# Patient Record
Sex: MALE | Race: WHITE | Employment: FULL TIME | URBAN - METROPOLITAN AREA
[De-identification: names, ages, dates, MRNs, and addresses within clinical notes are randomized per-mention and may not be internally consistent; named-entity substitution may affect disease eponyms.]

---

## 2017-04-25 PROBLEM — N20.0 KIDNEY STONE: Status: ACTIVE | Noted: 2017-04-25

## 2017-07-28 PROBLEM — N52.9 ED (ERECTILE DYSFUNCTION) OF ORGANIC ORIGIN: Status: ACTIVE | Noted: 2017-07-28

## 2017-07-28 PROBLEM — N53.14 RETROGRADE EJACULATION: Status: ACTIVE | Noted: 2017-07-28

## 2019-10-02 PROBLEM — R73.9 ACUTE HYPERGLYCEMIA: Status: ACTIVE | Noted: 2019-10-02

## 2019-10-02 PROBLEM — E87.5 HYPERKALEMIA: Status: ACTIVE | Noted: 2019-10-02

## 2021-04-07 ENCOUNTER — APPOINTMENT (OUTPATIENT)
Dept: CT IMAGING | Age: 26
End: 2021-04-07
Attending: EMERGENCY MEDICINE
Payer: COMMERCIAL

## 2021-04-07 ENCOUNTER — HOSPITAL ENCOUNTER (EMERGENCY)
Age: 26
Discharge: HOME | End: 2021-04-07
Attending: EMERGENCY MEDICINE
Payer: COMMERCIAL

## 2021-04-07 VITALS
OXYGEN SATURATION: 99 % | TEMPERATURE: 97.9 F | HEART RATE: 97 BPM | SYSTOLIC BLOOD PRESSURE: 125 MMHG | RESPIRATION RATE: 18 BRPM | DIASTOLIC BLOOD PRESSURE: 97 MMHG

## 2021-04-07 DIAGNOSIS — S06.0X0A CONCUSSION WITHOUT LOSS OF CONSCIOUSNESS, INITIAL ENCOUNTER: Primary | ICD-10-CM

## 2021-04-07 PROCEDURE — 70450 CT HEAD/BRAIN W/O DYE: CPT

## 2021-04-07 PROCEDURE — 99281 EMR DPT VST MAYX REQ PHY/QHP: CPT

## 2021-04-07 NOTE — Clinical Note
[address] [place] [address] Dr JACK BADILLO BEH [facility] EMERGENCY DEPT 
[address] [facility] [address] [phone] Work/School Note Date: 4/7/2021 To Whom It May concern: Jia Gonzales was seen and treated today in the emergency room by the following provider(s): 
Attending Provider: Maria E Murdock MD.   
 
Jia Gonzales is excused from work/school on 4/7/2021 through 4/10/2021. He is medically clear to return to work/school on 4/11/2021. Sincerely, Joseluis Jalloh MD

## 2021-04-07 NOTE — ED TRIAGE NOTES
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

## 2021-04-07 NOTE — ED PROVIDER NOTES
EMERGENCY DEPARTMENT HISTORY AND PHYSICAL EXAM      Date: 4/7/2021  Patient Name: Woodrow Sanders    History of Presenting Illness     Chief Complaint   Patient presents with    Head Injury       History (Context): Woodrow Sanders is a 22 y.o. gentleman with past medical history as noted below who presents with subacute onset, progressive, mild to moderate headache that is right-sided in the setting of banging his head on the ambulance yesterday. The patient does endorse some vision changes. No clear exacerbating/relieving features    On review of systems, the patient denies neck pain, facial pain, chest pain, back pain, abdominal pain, pelvic pain, extremity pain, numbness, weakness, tingling. PCP: Noreen Marx MD    Current Outpatient Medications   Medication Sig Dispense Refill    insulin NPH hum/reg insulin hm (NOVOLIN 70/30 SC) by SubCUTAneous route. Sliding scale      insulin glargine (LANTUS) 100 unit/mL injection 30 Units by SubCUTAneous route Before breakfast and dinner. Past History     Past Medical History:  Past Medical History:   Diagnosis Date    Abnormal LFTs     Diabetes ([address]     uncontrolled type 1 with diabetic neuorpathy.  DKA (diabetic ketoacidoses) ([address]     ED (erectile dysfunction)     Hypercholesterolemia     Insulin pump status     Kidney stones     Renal colic     Retrograde ejaculation     Ureteral stone        Past Surgical History:  Past Surgical History:   Procedure Laterality Date    HX APPENDECTOMY  2012    HX UROLOGICAL Right 02/02/2017    [facility]. Cysto,  Right ureteroscopy with stone extraction, Right double-J ureteral stent removal.  Dr. Reid Paez.  HX UROLOGICAL Right 01/25/2017    [facility]. Cysto, Rt RGP, Rt ureteral stent placement, attempted right ureteroscopy. Dr. Lorna Hunter.         Family History:  Family History   Problem Relation Age of Onset    Arthritis-osteo Father     Kidney Disease Father     Diabetes Maternal Grandmother Social History:  Social History     Tobacco Use    Smoking status: Current Some Day Smoker    Smokeless tobacco: Never Used   Substance Use Topics    Alcohol use: Yes     Comment: socially    Drug use: No       Allergies: Allergies   Allergen Reactions    Zofran Odt [Ondansetron] Angioedema    Reglan [Metoclopramide] Other (comments)     Pt. gets moodswings    Insulin Isophane (Nph) Hives       PMH, PSH, family history, social history, allergies reviewed with the patient with significant items noted above. Review of Systems   As per HPI, otherwise reviewed and negative. Physical Exam     Vitals:    04/07/21 1108   BP: (!) 125/97   Pulse: 97   Resp: 18   Temp: 97.9 °F (36.6 °C)   SpO2: 99%       Gen: Well-appearing, in no acute distress   HEENT: Atraumatic , normocephalic, sclera anicteric, no plascencia sign, no raccoon eyes, no hemotympanum, trachea is midline. Cardiovascular: Normal rate, regular rhythm, no murmurs, rubs, gallops. Radial pulses 2+, dorsalis pedis pulses 2+  Pulmonary: No bruising or crepitus to the chest.  Bilateral breath sounds equal with equal chest rise. No respiratory distress. No stridor. Clear lungs. ABD: Soft, nontender, nondistended. Neuro: GCS 15. Alert. Normal speech. Normal mentation. Full strength and sensation throughout. Psych: Normal thought content and thought processes. : No CVA tenderness. Pelvis stable  EXT: Moves all extremities well. No cyanosis or clubbing. Skin: Warm and well-perfused. Other:        Diagnostic Study Results     Labs -   No results found for this or any previous visit (from the past 12 hour(s)). Radiologic Studies -   CT HEAD WO CONT   Final Result      No acute intracranial process.           All CT scans at this facility are performed using dose optimization technique as   appropriate to the performed exam, to include automated exposure control,   adjustment of the mA and/or kV according to patient's size (Including appropriate matching for site-specific examinations), or use of iterative   reconstruction technique. CT Results  (Last 48 hours)    None        CXR Results  (Last 48 hours)    None            Medical Decision Making   I am the first provider for this patient. I reviewed the vital signs, available nursing notes, past medical history, past surgical history, family history and social history. Vital Signs-Reviewed the patient's vital signs. Records Reviewed: Personally, on initial evaluation    MDM:   Patient presents with headache and concussive symptoms after striking head last night. This is associated vision changes. Exam significant for normal neurologic exam.   DDX considered likely in this particular patient: Concussion, other traumatic brain injury, skull fracture  DDX always considered in trauma patient:facial fractures, pneumothorax, skeletal fractures, dislocations, intrathoracic or intra-abdominal bleeding or injury to organs, active bleeding, pelvic fracture, nonaccidental trauma. Patient condition on initial evaluation: Stable    Plan:     Orders as below:  Orders Placed This Encounter    CT HEAD WO CONT        ED Course:      CT head negative. Will discharge home    Patient condition at time of disposition:   DISCHARGE NOTE:   Pt has been reexamined. Patient has no new complaints, changes, or physical findings. Care plan outlined and precautions discussed. Results were reviewed with the patient. All medications were reviewed with the patient; will d/c home with no changes to meds besides Tylenol and ibuprofen. All of pt's questions and concerns were addressed. Alarm symptoms and return precautions associated with chief complaint and evaluation were reviewed with the patient in detail. The patient demonstrated adequate understanding. Patient was instructed and agrees to follow up with PCP as necessary, as well as to return to the ED upon further deterioration.  Patient is ready to go home. The patient is happy with this plan    Follow-up Information     Follow up With Specialties Details Why Contact Info    Eduardo Corcoran MD Internal Medicine Go to  concussion clearance Jack Amaya [place] [address]      [facility] EMERGENCY DEPT Emergency Medicine Go to  As needed, If symptoms worsen [address] [facility], [place] [facility] [address]  [phone]          Discharge Medication List as of 4/7/2021 12:16 PM          Procedures:  Procedures      Critical Care Time:     Disposition: Discharge home    Diagnosis     Clinical Impression:   1. Concussion without loss of consciousness, initial encounter        Signed,  Manny Vincent MD  Emergency Physician  [facility]    As a voice dictation software was utilized to dictate this note, minor word transpositions can occur. I apologize for confusing wording and typographic errors. Please feel free to contact me for clarification.